# Patient Record
Sex: FEMALE | Race: WHITE | Employment: UNEMPLOYED | URBAN - METROPOLITAN AREA
[De-identification: names, ages, dates, MRNs, and addresses within clinical notes are randomized per-mention and may not be internally consistent; named-entity substitution may affect disease eponyms.]

---

## 2018-05-17 ENCOUNTER — HOSPITAL ENCOUNTER (EMERGENCY)
Age: 48
Discharge: HOME | End: 2018-05-17
Attending: EMERGENCY MEDICINE
Payer: SELF-PAY

## 2018-05-17 VITALS
SYSTOLIC BLOOD PRESSURE: 138 MMHG | RESPIRATION RATE: 18 BRPM | OXYGEN SATURATION: 98 % | HEART RATE: 69 BPM | DIASTOLIC BLOOD PRESSURE: 92 MMHG | TEMPERATURE: 98.6 F

## 2018-05-17 DIAGNOSIS — H60.331 ACUTE SWIMMER'S EAR OF RIGHT SIDE: Primary | ICD-10-CM

## 2018-05-17 DIAGNOSIS — Z76.0 MEDICATION REFILL: ICD-10-CM

## 2018-05-17 PROCEDURE — 99281 EMR DPT VST MAYX REQ PHY/QHP: CPT

## 2018-05-17 RX ORDER — ALBUTEROL SULFATE 90 UG/1
2 AEROSOL, METERED RESPIRATORY (INHALATION)
Qty: 1 INHALER | Refills: 0 | Status: SHIPPED | OUTPATIENT
Start: 2018-05-17 | End: 2019-12-09

## 2018-05-17 NOTE — ED TRIAGE NOTES
C/o rt ear pain X one week after swimming.  Pt also states she needs refill on neb treatment and rescue in [place]

## 2018-05-17 NOTE — ED PROVIDER NOTES
EMERGENCY DEPARTMENT HISTORY AND PHYSICAL EXAM    4:23 PM      Date: 5/17/2018  Patient Name: Candace Guerra    History of Presenting Illness     Chief Complaint   Patient presents with    Ear Pain         History Provided By: Patient    Chief Complaint: Ear pain  Duration:  1 week  Timing:  N/A  Location: Right ear  Quality: Throbbing  Severity: N/A  Modifying Factors: The patient states that he had went swimming and used cotton swabs in her ear. The patient states she has been taking ibuprofen, but her pain has not been relieved. Associated Symptoms: Denies nasal congestion and rhinorrhea. Additional History (Context): Candace Guerra is a 41 y.o. female with asthma who presents with right ear pain that began 1 week ago. The patient describes her pain as throbbing. She states that she had went swimming and then used cotton swabs in her ear. The patient states she has been taking ibuprofen, but her pain has not been relieved. She denies nasal congestion and rhinorrhea. The patient is also requesting a refill of her medication for her inhaler. The patient smokes tobacco. The patient has no further complaints. PCP: [facility]    Current Outpatient Prescriptions   Medication Sig Dispense Refill    albuterol (PROAIR HFA) 90 mcg/actuation inhaler Take 2 Puffs by inhalation every six (6) hours as needed for Wheezing. 1 Inhaler 0    neomycin-colist-hydrocortisone-thonzonium (CORTISPORIN-TC) otic suspension Administer 3 Drops in right ear four (4) times daily for 7 days. 5 mL 0    lisinopril-hydroCHLOROthiazide (PRINZIDE, ZESTORETIC) 10-12.5 mg per tablet Take 1 Tab by mouth daily. 30 Tab 2    fluticasone-salmeterol (ADVAIR DISKUS) 100-50 mcg/dose diskus inhaler Take 1 Puff by inhalation every twelve (12) hours. 1 Inhaler 0    fexofenadine (ALLEGRA) 60 mg tablet Take 1 Tab by mouth daily.  20 Tab 0       Past History     Past Medical History:  Past Medical History:   Diagnosis Date    Acute back pain with sciatica 2007    Asthma     H/O seasonal allergies     Hypertension     Ill-defined condition     neuropathy in feet       Past Surgical History:  Past Surgical History:   Procedure Laterality Date    HX DILATION AND CURETTAGE  1999    5-6    HX OTHER SURGICAL  1977    KIDNEY- blockage        Family History:  Family History   Problem Relation Age of Onset    COPD Mother     Diabetes Brother     Diabetes Maternal Grandmother        Social History:  Social History   Substance Use Topics    Smoking status: Current Every Day Smoker     Packs/day: 1.00     Years: 28.00    Smokeless tobacco: Former User    Alcohol use No       Allergies:  No Known Allergies      Review of Systems       Review of Systems   Constitutional: Negative for activity change, fatigue and fever. HENT: Positive for ear pain. Negative for congestion and rhinorrhea. Eyes: Negative for visual disturbance. Respiratory: Negative for shortness of breath. Cardiovascular: Negative for chest pain and palpitations. Gastrointestinal: Negative for abdominal pain, diarrhea, nausea and vomiting. Genitourinary: Negative for dysuria and hematuria. Musculoskeletal: Negative for back pain. Skin: Negative for rash. Neurological: Negative for dizziness, weakness and light-headedness. Psychiatric/Behavioral: Negative for agitation. All other systems reviewed and are negative. Physical Exam     Visit Vitals    BP (!) 138/92 (BP 1 Location: Left arm, BP Patient Position: Sitting)    Pulse 69    Temp 98.6 °F (37 °C)    Resp 18    SpO2 98%         Physical Exam   Constitutional: She appears well-developed and well-nourished. No distress. HENT:   Head: Normocephalic and atraumatic.    Right Ear: Tympanic membrane normal.   Left Ear: Tympanic membrane normal.   Nose: Nose normal.   Mouth/Throat: Oropharynx is clear and moist.   Erythema and edema of right canal.   Eyes: Conjunctivae and EOM are normal. Pupils are equal, round, and reactive to light. No scleral icterus. Neck: Normal range of motion. Neck supple. No JVD present. No tracheal deviation present. No thyromegaly present. Cardiovascular: Normal rate and regular rhythm. Exam reveals no friction rub. No murmur heard. Pulmonary/Chest: Effort normal and breath sounds normal. No stridor. She exhibits no tenderness. Abdominal: Soft. Bowel sounds are normal. She exhibits no distension. There is no tenderness. There is no rebound and no guarding. Musculoskeletal: Normal range of motion. She exhibits no edema or tenderness. Lymphadenopathy:     She has no cervical adenopathy. Neurological: She is alert. No cranial nerve deficit. Coordination normal.   Skin: Skin is warm and dry. Psychiatric: She has a normal mood and affect. Her behavior is normal. Judgment and thought content normal.   Nursing note and vitals reviewed. Medical Decision Making   I am the first provider for this patient. I reviewed the vital signs, available nursing notes, past medical history, past surgical history, family history and social history. Vital Signs-Reviewed the patient's vital signs. Records Reviewed: Nursing Notes (Time of Review: 4:23 PM)    ED Course: Progress Notes, Reevaluation, and Consults:      Provider Notes (Medical Decision Making):   Erythema and edema to right ear canal x 1 week. She notes swimming recently. Will treat with cortisporin otic. She also needs a refill of her albuterol inhaler -- will write for this as well. Pt to f/u with PCP. Diagnosis     Clinical Impression:   1. Acute swimmer's ear of right side    2.  Medication refill        Disposition: Discharged    Follow-up Information     Follow up With Details Comments [address] In 3 days  [address] [id number]  [phone]      [facility] EMERGENCY DEPT  If symptoms worsen [address] [address]  [phone] Patient's Medications   Start Taking    NEOMYCIN-COLIST-HYDROCORTISONE-THONZONIUM (CORTISPORIN-TC) OTIC SUSPENSION    Administer 3 Drops in right ear four (4) times daily for 7 days. Continue Taking    FEXOFENADINE (ALLEGRA) 60 MG TABLET    Take 1 Tab by mouth daily. FLUTICASONE-SALMETEROL (ADVAIR DISKUS) 100-50 MCG/DOSE DISKUS INHALER    Take 1 Puff by inhalation every twelve (12) hours. LISINOPRIL-HYDROCHLOROTHIAZIDE (PRINZIDE, ZESTORETIC) 10-12.5 MG PER TABLET    Take 1 Tab by mouth daily. These Medications have changed    Modified Medication Previous Medication    ALBUTEROL (PROAIR HFA) 90 MCG/ACTUATION INHALER albuterol (PROAIR HFA) 90 mcg/actuation inhaler       Take 2 Puffs by inhalation every six (6) hours as needed for Wheezing. Take 2 Puffs by inhalation every six (6) hours as needed for Wheezing. Stop Taking    No medications on file     _______________________________    Attestations:  Swati [id number] [facility] acting as a scribe for and in the presence of Susie Painter      May 17, 2018 at 4:53 PM       Provider Attestation:      I personally performed the services described in the documentation, reviewed the documentation, as recorded by the scribe in my presence, and it accurately and completely records my words and actions.  May 17, 2018 at 4:53 PM - LEATHA Painter  _______________________________    LEATHA Ashby

## 2018-10-18 ENCOUNTER — APPOINTMENT (OUTPATIENT)
Dept: CT IMAGING | Age: 48
End: 2018-10-18
Attending: PHYSICIAN ASSISTANT
Payer: SELF-PAY

## 2018-10-18 ENCOUNTER — HOSPITAL ENCOUNTER (EMERGENCY)
Age: 48
Discharge: HOME | End: 2018-10-18
Attending: EMERGENCY MEDICINE
Payer: SELF-PAY

## 2018-10-18 VITALS
SYSTOLIC BLOOD PRESSURE: 159 MMHG | HEART RATE: 69 BPM | DIASTOLIC BLOOD PRESSURE: 110 MMHG | RESPIRATION RATE: 14 BRPM | OXYGEN SATURATION: 98 % | TEMPERATURE: 97.6 F

## 2018-10-18 DIAGNOSIS — G89.29 CHRONIC NONINTRACTABLE HEADACHE, UNSPECIFIED HEADACHE TYPE: Primary | ICD-10-CM

## 2018-10-18 DIAGNOSIS — I10 ESSENTIAL HYPERTENSION: ICD-10-CM

## 2018-10-18 DIAGNOSIS — R51.9 CHRONIC NONINTRACTABLE HEADACHE, UNSPECIFIED HEADACHE TYPE: Primary | ICD-10-CM

## 2018-10-18 LAB
ANION GAP SERPL CALC-SCNC: 7 MMOL/L (ref 3–18)
APPEARANCE UR: ABNORMAL
BACTERIA URNS QL MICRO: ABNORMAL /HPF
BASOPHILS # BLD: 0 K/UL (ref 0–0.1)
BASOPHILS NFR BLD: 0 % (ref 0–2)
BILIRUB UR QL: NEGATIVE
BUN SERPL-MCNC: 7 MG/DL (ref 7–18)
BUN/CREAT SERPL: 8 (ref 12–20)
CALCIUM SERPL-MCNC: 8.2 MG/DL (ref 8.5–10.1)
CHLORIDE SERPL-SCNC: 104 MMOL/L (ref 100–108)
CO2 SERPL-SCNC: 25 MMOL/L (ref 21–32)
COLOR UR: ABNORMAL
CREAT SERPL-MCNC: 0.84 MG/DL (ref 0.6–1.3)
DIFFERENTIAL METHOD BLD: ABNORMAL
EOSINOPHIL # BLD: 0.3 K/UL (ref 0–0.4)
EOSINOPHIL NFR BLD: 3 % (ref 0–5)
EPITH CASTS URNS QL MICRO: ABNORMAL /LPF (ref 0–5)
ERYTHROCYTE [DISTWIDTH] IN BLOOD BY AUTOMATED COUNT: 15.2 % (ref 11.6–14.5)
GLUCOSE SERPL-MCNC: 80 MG/DL (ref 74–99)
GLUCOSE UR STRIP.AUTO-MCNC: NEGATIVE MG/DL
HCT VFR BLD AUTO: 43.2 % (ref 35–45)
HGB BLD-MCNC: 14.8 G/DL (ref 12–16)
HGB UR QL STRIP: NEGATIVE
KETONES UR QL STRIP.AUTO: ABNORMAL MG/DL
LEUKOCYTE ESTERASE UR QL STRIP.AUTO: ABNORMAL
LYMPHOCYTES # BLD: 2.6 K/UL (ref 0.9–3.6)
LYMPHOCYTES NFR BLD: 33 % (ref 21–52)
MCH RBC QN AUTO: 28.9 PG (ref 24–34)
MCHC RBC AUTO-ENTMCNC: 34.3 G/DL (ref 31–37)
MCV RBC AUTO: 84.4 FL (ref 74–97)
MONOCYTES # BLD: 0.5 K/UL (ref 0.05–1.2)
MONOCYTES NFR BLD: 7 % (ref 3–10)
NEUTS SEG # BLD: 4.4 K/UL (ref 1.8–8)
NEUTS SEG NFR BLD: 57 % (ref 40–73)
NITRITE UR QL STRIP.AUTO: NEGATIVE
PH UR STRIP: 7.5 [PH] (ref 5–8)
PLATELET # BLD AUTO: 194 K/UL (ref 135–420)
PMV BLD AUTO: 11.2 FL (ref 9.2–11.8)
POTASSIUM SERPL-SCNC: 3.5 MMOL/L (ref 3.5–5.5)
PROT UR STRIP-MCNC: ABNORMAL MG/DL
RBC # BLD AUTO: 5.12 M/UL (ref 4.2–5.3)
RBC #/AREA URNS HPF: NEGATIVE /HPF (ref 0–5)
SODIUM SERPL-SCNC: 136 MMOL/L (ref 136–145)
SP GR UR REFRACTOMETRY: 1.02 (ref 1–1.03)
UROBILINOGEN UR QL STRIP.AUTO: 1 EU/DL (ref 0.2–1)
WBC # BLD AUTO: 7.7 K/UL (ref 4.6–13.2)
WBC URNS QL MICRO: ABNORMAL /HPF (ref 0–4)

## 2018-10-18 PROCEDURE — 81001 URINALYSIS AUTO W/SCOPE: CPT | Performed by: PHYSICIAN ASSISTANT

## 2018-10-18 PROCEDURE — 70450 CT HEAD/BRAIN W/O DYE: CPT

## 2018-10-18 PROCEDURE — 99284 EMERGENCY DEPT VISIT MOD MDM: CPT

## 2018-10-18 PROCEDURE — 74011250637 HC RX REV CODE- 250/637: Performed by: PHYSICIAN ASSISTANT

## 2018-10-18 PROCEDURE — 80048 BASIC METABOLIC PNL TOTAL CA: CPT | Performed by: PHYSICIAN ASSISTANT

## 2018-10-18 PROCEDURE — 85025 COMPLETE CBC W/AUTO DIFF WBC: CPT | Performed by: PHYSICIAN ASSISTANT

## 2018-10-18 RX ORDER — ACETAMINOPHEN 500 MG
1000 TABLET ORAL
Status: COMPLETED | OUTPATIENT
Start: 2018-10-18 | End: 2018-10-18

## 2018-10-18 RX ORDER — LISINOPRIL AND HYDROCHLOROTHIAZIDE 10; 12.5 MG/1; MG/1
1 TABLET ORAL DAILY
Qty: 30 TAB | Refills: 0 | Status: SHIPPED | OUTPATIENT
Start: 2018-10-18 | End: 2021-01-18 | Stop reason: SDUPTHER

## 2018-10-18 RX ORDER — HYDROCHLOROTHIAZIDE 25 MG/1
25 TABLET ORAL
Status: COMPLETED | OUTPATIENT
Start: 2018-10-18 | End: 2018-10-18

## 2018-10-18 RX ORDER — FLUTICASONE PROPIONATE AND SALMETEROL 100; 50 UG/1; UG/1
1 POWDER RESPIRATORY (INHALATION) EVERY 12 HOURS
Qty: 1 INHALER | Refills: 0 | Status: SHIPPED | OUTPATIENT
Start: 2018-10-18 | End: 2019-12-09

## 2018-10-18 RX ADMIN — ACETAMINOPHEN 1000 MG: 500 TABLET, FILM COATED ORAL at 14:07

## 2018-10-18 RX ADMIN — HYDROCHLOROTHIAZIDE 25 MG: 25 TABLET ORAL at 14:07

## 2018-10-18 NOTE — ED NOTES
I performed a brief evaluation, including history and physical, of the patient here in triage and I have determined that pt will need further treatment and evaluation from the FT Provider. I have placed initial orders to help in expediting patients care. October 18, 2018 at 1:04 PM - Alfredo Hoffman PA-C Visit Vitals BP (!) 178/107 (BP 1 Location: Left arm, BP Patient Position: At rest) Pulse 98 Temp 97.6 °F (36.4 °C) Resp 14 SpO2 98%

## 2018-10-18 NOTE — ED PROVIDER NOTES
EMERGENCY DEPARTMENT HISTORY AND PHYSICAL EXAM 
 
1:12 PM 
 
 
Date: 10/18/2018 Patient Name: Marlin Santos History of Presenting Illness Chief Complaint Patient presents with  Migraine History Provided By: Patient Chief Complaint: Headache Duration: 3  Weeks Timing:  Constant Location: Global 
Quality: Throbbing Severity: 8 out of 10 Modifying Factors: Tylenol Associated Symptoms: denies any other associated signs or symptoms Additional History (Context): Marlin Santos is a 41 y.o. female with hx of hypertension who presents with headache for 3 weeks. Pain is global, throbbing, and constant. Patient reports similar headache four years ago that she associates with HTN. She began treatment for HTN one year ago with HCTZ but has not taken the medication for 2 months because of insurance issues. She denies visual disturbances, photophobia, phonophobia, neck pain, fever, chills, nausea, vomiting, numbness/tingling, weakness, dizziness, chest pain, dyspnea. Pt did not take any medication for symptoms PTA. PCP: None Current Outpatient Medications Medication Sig Dispense Refill  lisinopril-hydroCHLOROthiazide (PRINZIDE, ZESTORETIC) 10-12.5 mg per tablet Take 1 Tab by mouth daily. 30 Tab 0  
 fluticasone-salmeterol (ADVAIR DISKUS) 100-50 mcg/dose diskus inhaler Take 1 Puff by inhalation every twelve (12) hours. 1 Inhaler 0  
 albuterol (PROAIR HFA) 90 mcg/actuation inhaler Take 2 Puffs by inhalation every six (6) hours as needed for Wheezing. 1 Inhaler 0  
 fexofenadine (ALLEGRA) 60 mg tablet Take 1 Tab by mouth daily. 20 Tab 0 Past History Past Medical History: 
Past Medical History:  
Diagnosis Date  Acute back pain with sciatica 2007  Asthma  H/O seasonal allergies  Hypertension  Ill-defined condition   
 neuropathy in feet Past Surgical History: 
Past Surgical History:  
Procedure Laterality Date [address]  
 5-6 [address] KIDNEY- blockage Family History: 
Family History Problem Relation Age of Onset  COPD Mother  Diabetes Brother  Diabetes Maternal Grandmother Social History: 
Social History Tobacco Use  Smoking status: Current Every Day Smoker Packs/day: 1.00 Years: 28.00 Pack years: 28.00  Smokeless tobacco: Former User Substance Use Topics  Alcohol use: No  
 Drug use: No  
 
 
Allergies: Allergies Allergen Reactions  Plavix [Clopidogrel] Hives Review of Systems Review of Systems Constitutional: Negative for chills, fatigue, fever and unexpected weight change. Eyes: Negative for photophobia, pain and visual disturbance. Respiratory: Negative for chest tightness and shortness of breath. Cardiovascular: Negative for chest pain and palpitations. Gastrointestinal: Negative. Musculoskeletal: Negative for neck pain and neck stiffness. Skin: Negative for rash. Neurological: Positive for headaches. Negative for dizziness and light-headedness. Physical Exam  
 
Visit Vitals BP (!) 159/110 Pulse 69 Temp 97.6 °F (36.4 °C) Resp 14 SpO2 98% Physical Exam  
Constitutional: She is oriented to person, place, and time. She appears well-developed and well-nourished. No distress. HENT:  
Head: Normocephalic and atraumatic. Eyes: Conjunctivae and EOM are normal. Pupils are equal, round, and reactive to light. Neck: Normal range of motion. Neck supple. No spinous process tenderness present. No neck rigidity. Normal range of motion present. No nuchal rigidity Cardiovascular: Normal rate, regular rhythm, normal heart sounds and intact distal pulses. Pulmonary/Chest: Effort normal and breath sounds normal.  
Neurological: She is alert and oriented to person, place, and time. She has normal strength. She is not disoriented.  No cranial nerve deficit or sensory deficit. She exhibits normal muscle tone. She displays a negative Romberg sign. Coordination and gait normal. GCS eye subscore is 4. GCS verbal subscore is 5. GCS motor subscore is 6. Skin: No rash noted. She is not diaphoretic. Nursing note and vitals reviewed. Diagnostic Study Results Labs - Recent Results (from the past 12 hour(s)) URINALYSIS W/ RFLX MICROSCOPIC Collection Time: 10/18/18  1:00 PM  
Result Value Ref Range Color DARK YELLOW Appearance TURBID Specific gravity 1.025 1.005 - 1.030    
 pH (UA) 7.5 5.0 - 8.0 Protein TRACE (A) NEG mg/dL Glucose NEGATIVE  NEG mg/dL Ketone TRACE (A) NEG mg/dL Bilirubin NEGATIVE  NEG Blood NEGATIVE  NEG Urobilinogen 1.0 0.2 - 1.0 EU/dL Nitrites NEGATIVE  NEG Leukocyte Esterase TRACE (A) NEG URINE MICROSCOPIC ONLY Collection Time: 10/18/18  1:00 PM  
Result Value Ref Range WBC 0 to 3 0 - 4 /hpf  
 RBC NEGATIVE  0 - 5 /hpf Epithelial cells 4+ 0 - 5 /lpf Bacteria FEW (A) NEG /hpf METABOLIC PANEL, BASIC Collection Time: 10/18/18  2:13 PM  
Result Value Ref Range Sodium 136 136 - 145 mmol/L Potassium 3.5 3.5 - 5.5 mmol/L Chloride 104 100 - 108 mmol/L  
 CO2 25 21 - 32 mmol/L Anion gap 7 3.0 - 18 mmol/L Glucose 80 74 - 99 mg/dL BUN 7 7.0 - 18 MG/DL Creatinine 0.84 0.6 - 1.3 MG/DL  
 BUN/Creatinine ratio 8 (L) 12 - 20 GFR est AA >60 >60 ml/min/1.73m2 GFR est non-AA >60 >60 ml/min/1.73m2 Calcium 8.2 (L) 8.5 - 10.1 MG/DL  
CBC WITH AUTOMATED DIFF Collection Time: 10/18/18  2:13 PM  
Result Value Ref Range WBC 7.7 4.6 - 13.2 K/uL  
 RBC 5.12 4.20 - 5.30 M/uL  
 HGB 14.8 12.0 - 16.0 g/dL HCT 43.2 35.0 - 45.0 % MCV 84.4 74.0 - 97.0 FL  
 MCH 28.9 24.0 - 34.0 PG  
 MCHC 34.3 31.0 - 37.0 g/dL  
 RDW 15.2 (H) 11.6 - 14.5 % PLATELET 176 656 - 565 K/uL MPV 11.2 9.2 - 11.8 FL  
 NEUTROPHILS 57 40 - 73 % LYMPHOCYTES 33 21 - 52 % MONOCYTES 7 3 - 10 % EOSINOPHILS 3 0 - 5 % BASOPHILS 0 0 - 2 %  
 ABS. NEUTROPHILS 4.4 1.8 - 8.0 K/UL  
 ABS. LYMPHOCYTES 2.6 0.9 - 3.6 K/UL  
 ABS. MONOCYTES 0.5 0.05 - 1.2 K/UL  
 ABS. EOSINOPHILS 0.3 0.0 - 0.4 K/UL  
 ABS. BASOPHILS 0.0 0.0 - 0.1 K/UL  
 DF AUTOMATED Radiologic Studies -  
CT HEAD WO CONT Final Result IMPRESSION:  
No acute hemorrhage or midline shift. No evidence for a fracture in the calvarium. Margot Luz Medical Decision Making I am the first provider for this patient. I reviewed the vital signs, available nursing notes, past medical history, past surgical history, family history and social history. Vital Signs-Reviewed the patient's vital signs. Records Reviewed: Nursing Notes and Old Medical Records (Time of Review: 1:12 PM) 
 
ED Course: Progress Notes, Reevaluation, and Consults: 
2:22PM:  Pt given Tylenol 1000mg and HCTZ 25mg 3:49:  Pt reports headache has improved. Was 9/10 and is now 4/10 
03:59:  Discussed CT results with patient and she understands the importance of following up with primary care physician for HTN control. She now reports her headache has improved to 2/10. Looks well, texting on phone, stable for d/c with outpatient follow-up.  by to see patient. Provider Notes (Medical Decision Making):  
Patient presents with global throbbing headache for 3 weeks; similar headache 4 years ago. She denies photophobia, nausea, vomiting, nuchal rigidity, chest pain, dyspnea, and visual disturbances. CT scan of head is negative. Headache and HTN have improved during visit with dose of home medication and Tylenol. Patient understands the importance of close follow up with primary care physician for HTN control. Diagnosis Clinical Impression: 1. Chronic nonintractable headache, unspecified headache type 2. Essential hypertension Disposition: home Follow-up Information Follow up With Specialties Details Why Contact Info [address]urnima Sanchez on 10/22/2018 GO TO [facility] AT [address] [facility]. TAKE ID AND PRERPARE TO BE THERE A WHILE DUE TO DEMAND. FIRST COME FIRST SERVE BASES SO ARRIVE 7:30AM-7:45AM [address] [address]) [phone] None    None (395) Patient stated that they have no PCP None    None (395) Patient stated that they have no PCP Medication List  
  
CONTINUE taking these medications   
fluticasone-salmeterol 100-50 mcg/dose diskus inhaler Commonly known as:  ADVAIR DISKUS Take 1 Puff by inhalation every twelve (12) hours. lisinopril-hydroCHLOROthiazide 10-12.5 mg per tablet Commonly known as:  Buddie Fus Take 1 Tab by mouth daily. ASK your doctor about these medications   
albuterol 90 mcg/actuation inhaler Commonly known as:  PROAIR HFA Take 2 Puffs by inhalation every six (6) hours as needed for Wheezing. fexofenadine 60 mg tablet Commonly known as:  Banerjee Dory Take 1 Tab by mouth daily. Where to Get Your Medications Information about where to get these medications is not yet available Ask your nurse or doctor about these medications · fluticasone-salmeterol 100-50 mcg/dose diskus inhaler · lisinopril-hydroCHLOROthiazide 10-12.5 mg per tablet

## 2018-10-18 NOTE — DISCHARGE INSTRUCTIONS
Head or Face Pain: Care Instructions  Your Care Instructions    Common causes of head or face pain are allergies, stress, and injuries. Other causes include tooth problems and sinus infections. Eating certain foods, such as chocolate or cheese, or drinking certain liquids, such as coffee or cola, can cause head pain for some people. If you have mild head pain, you may not need treatment. It is important to watch your symptoms and talk to your doctor if your pain continues or gets worse. Follow-up care is a key part of your treatment and safety. Be sure to make and go to all appointments, and call your doctor if you are having problems. It's also a good idea to know your test results and keep a list of the medicines you take. How can you care for yourself at home? · Take pain medicines exactly as directed. ? If the doctor gave you a prescription medicine for pain, take it as prescribed. ? If you are not taking a prescription pain medicine, ask your doctor if you can take an over-the-counter pain medicine. · Take it easy for the next few days or longer if you are not feeling well. · Use a warm, moist towel or heating pad set on low to relax tight muscles in your shoulder and neck. Have someone gently massage your neck and shoulders. · Put ice or a cold pack on the area for 10 to 20 minutes at a time. Put a thin cloth between the ice and your skin. When should you call for help? Call 911 anytime you think you may need emergency care. For example, call if:    · You have twitching, jerking, or a seizure.     · You passed out (lost consciousness).     · You have symptoms of a stroke. These may include:  ? Sudden numbness, tingling, weakness, or loss of movement in your face, arm, or leg, especially on only one side of your body. ? Sudden vision changes. ? Sudden trouble speaking. ? Sudden confusion or trouble understanding simple statements. ? Sudden problems with walking or balance.   ? A sudden, severe headache that is different from past headaches.     · You have jaw pain and pain in your chest, shoulder, neck, or arm.    Call your doctor now or seek immediate medical care if:    · You have a fever with a stiff neck or a severe headache.     · You have nausea and vomiting, or you cannot keep food or liquids down.    Watch closely for changes in your health, and be sure to contact your doctor if:    · Your head or face pain does not get better as expected. Where can you learn more? Go to http://sophie-papo.info/. Enter P568 in the search box to learn more about \"Head or Face Pain: Care Instructions. \"  Current as of: November 20, 2017  Content Version: 11.8  © 3861-7521 [employer]. Care instructions adapted under license by [employer] (which disclaims liability or warranty for this information). If you have questions about a medical condition or this instruction, always ask your healthcare professional. [address] any warranty or liability for your use of this information. DASH Diet: Care Instructions  Your Care Instructions    The DASH diet is an eating plan that can help lower your blood pressure. DASH stands for Dietary Approaches to Stop Hypertension. Hypertension is high blood pressure. The DASH diet focuses on eating foods that are high in calcium, potassium, and magnesium. These nutrients can lower blood pressure. The foods that are highest in these nutrients are fruits, vegetables, low-fat dairy products, nuts, seeds, and legumes. But taking calcium, potassium, and magnesium supplements instead of eating foods that are high in those nutrients does not have the same effect. The DASH diet also includes whole grains, fish, and poultry. The DASH diet is one of several lifestyle changes your doctor may recommend to lower your high blood pressure.  Your doctor may also want you to decrease the amount of sodium in your diet. Lowering sodium while following the DASH diet can lower blood pressure even further than just the DASH diet alone. Follow-up care is a key part of your treatment and safety. Be sure to make and go to all appointments, and call your doctor if you are having problems. It's also a good idea to know your test results and keep a list of the medicines you take. How can you care for yourself at home? Following the DASH diet  · Eat 4 to 5 servings of fruit each day. A serving is 1 medium-sized piece of fruit, ½ cup chopped or canned fruit, 1/4 cup dried fruit, or 4 ounces (½ cup) of fruit juice. Choose fruit more often than fruit juice. · Eat 4 to 5 servings of vegetables each day. A serving is 1 cup of lettuce or raw leafy vegetables, ½ cup of chopped or cooked vegetables, or 4 ounces (½ cup) of vegetable juice. Choose vegetables more often than vegetable juice. · Get 2 to 3 servings of low-fat and fat-free dairy each day. A serving is 8 ounces of milk, 1 cup of yogurt, or 1 ½ ounces of cheese. · Eat 6 to 8 servings of grains each day. A serving is 1 slice of bread, 1 ounce of dry cereal, or ½ cup of cooked rice, pasta, or cooked cereal. Try to choose whole-grain products as much as possible. · Limit lean meat, poultry, and fish to 2 servings each day. A serving is 3 ounces, about the size of a deck of cards. · Eat 4 to 5 servings of nuts, seeds, and legumes (cooked dried beans, lentils, and split peas) each week. A serving is 1/3 cup of nuts, 2 tablespoons of seeds, or ½ cup of cooked beans or peas. · Limit fats and oils to 2 to 3 servings each day. A serving is 1 teaspoon of vegetable oil or 2 tablespoons of salad dressing. · Limit sweets and added sugars to 5 servings or less a week. A serving is 1 tablespoon jelly or jam, ½ cup sorbet, or 1 cup of lemonade. · Eat less than 2,300 milligrams (mg) of sodium a day.  If you limit your sodium to 1,500 mg a day, you can lower your blood pressure even more.  Tips for success  · Start small. Do not try to make dramatic changes to your diet all at once. You might feel that you are missing out on your favorite foods and then be more likely to not follow the plan. Make small changes, and stick with them. Once those changes become habit, add a few more changes. · Try some of the following:  ? Make it a goal to eat a fruit or vegetable at every meal and at snacks. This will make it easy to get the recommended amount of fruits and vegetables each day. ? Try yogurt topped with fruit and nuts for a snack or healthy dessert. ? Add lettuce, tomato, cucumber, and onion to sandwiches. ? Combine a ready-made pizza crust with low-fat mozzarella cheese and lots of vegetable toppings. Try using tomatoes, squash, spinach, broccoli, carrots, cauliflower, and onions. ? Have a variety of cut-up vegetables with a low-fat dip as an appetizer instead of chips and dip. ? Sprinkle sunflower seeds or chopped almonds over salads. Or try adding chopped walnuts or almonds to cooked vegetables. ? Try some vegetarian meals using beans and peas. Add garbanzo or kidney beans to salads. Make burritos and tacos with mashed osborn beans or black beans. Where can you learn more? Go to http://sophie-papo.info/. Enter L807 in the search box to learn more about \"DASH Diet: Care Instructions. \"  Current as of: December 6, 2017  Content Version: 11.8  © 9127-5518 [email or web address]. Care instructions adapted under license by [employer] (which disclaims liability or warranty for this information). If you have questions about a medical condition or this instruction, always ask your healthcare professional. [place] [address] any warranty or liability for your use of this information.            High Blood Pressure: Care Instructions  Your Care Instructions    If your blood pressure is usually above 130/80, you have high blood pressure, or hypertension. That means the top number is 130 or higher or the bottom number is 80 or higher, or both. Despite what a lot of people think, high blood pressure usually doesn't cause headaches or make you feel dizzy or lightheaded. It usually has no symptoms. But it does increase your risk for heart attack, stroke, and kidney or eye damage. The higher your blood pressure, the more your risk increases. Your doctor will give you a goal for your blood pressure. Your goal will be based on your health and your age. Lifestyle changes, such as eating healthy and being active, are always important to help lower blood pressure. You might also take medicine to reach your blood pressure goal.  Follow-up care is a key part of your treatment and safety. Be sure to make and go to all appointments, and call your doctor if you are having problems. It's also a good idea to know your test results and keep a list of the medicines you take. How can you care for yourself at home? Medical treatment  · If you stop taking your medicine, your blood pressure will go back up. You may take one or more types of medicine to lower your blood pressure. Be safe with medicines. Take your medicine exactly as prescribed. Call your doctor if you think you are having a problem with your medicine. · Talk to your doctor before you start taking aspirin every day. Aspirin can help certain people lower their risk of a heart attack or stroke. But taking aspirin isn't right for everyone, because it can cause serious bleeding. · See your doctor regularly. You may need to see the doctor more often at first or until your blood pressure comes down. · If you are taking blood pressure medicine, talk to your doctor before you take decongestants or anti-inflammatory medicine, such as ibuprofen. Some of these medicines can raise blood pressure. · Learn how to check your blood pressure at home. Lifestyle changes  · Stay at a healthy weight.  This is especially important if you put on weight around the waist. Losing even 10 pounds can help you lower your blood pressure. · If your doctor recommends it, get more exercise. Walking is a good choice. Bit by bit, increase the amount you walk every day. Try for at least 30 minutes on most days of the week. You also may want to swim, bike, or do other activities. · Avoid or limit alcohol. Talk to your doctor about whether you can drink any alcohol. · Try to limit how much sodium you eat to less than 2,300 milligrams (mg) a day. Your doctor may ask you to try to eat less than 1,500 mg a day. · Eat plenty of fruits (such as bananas and oranges), vegetables, legumes, whole grains, and low-fat dairy products. · Lower the amount of saturated fat in your diet. Saturated fat is found in animal products such as milk, cheese, and meat. Limiting these foods may help you lose weight and also lower your risk for heart disease. · Do not smoke. Smoking increases your risk for heart attack and stroke. If you need help quitting, talk to your doctor about stop-smoking programs and medicines. These can increase your chances of quitting for good. When should you call for help? Call 911 anytime you think you may need emergency care. This may mean having symptoms that suggest that your blood pressure is causing a serious heart or blood vessel problem. Your blood pressure may be over 180/120.   For example, call 911 if:    · You have symptoms of a heart attack. These may include:  ? Chest pain or pressure, or a strange feeling in the chest.  ? Sweating. ? Shortness of breath. ? Nausea or vomiting. ? Pain, pressure, or a strange feeling in the back, neck, jaw, or upper belly or in one or both shoulders or arms. ? Lightheadedness or sudden weakness. ? A fast or irregular heartbeat.     · You have symptoms of a stroke.  These may include:  ? Sudden numbness, tingling, weakness, or loss of movement in your face, arm, or leg, especially on only one side of your body. ? Sudden vision changes. ? Sudden trouble speaking. ? Sudden confusion or trouble understanding simple statements. ? Sudden problems with walking or balance. ? A sudden, severe headache that is different from past headaches.     · You have severe back or belly pain.    Do not wait until your blood pressure comes down on its own. Get help right away.   Call your doctor now or seek immediate care if:    · Your blood pressure is much higher than normal (such as 180/120 or higher), but you don't have symptoms.     · You think high blood pressure is causing symptoms, such as:  ? Severe headache.  ? Blurry vision.    Watch closely for changes in your health, and be sure to contact your doctor if:    · Your blood pressure measures higher than your doctor recommends at least 2 times. That means the top number is higher or the bottom number is higher, or both.     · You think you may be having side effects from your blood pressure medicine. Where can you learn more? Go to http://sophie-papo.info/. Enter X714 in the search box to learn more about \"High Blood Pressure: Care Instructions. \"  Current as of: December 6, 2017  Content Version: 11.8  © 1635-2926 [employer]. Care instructions adapted under license by [employer] (which disclaims liability or warranty for this information). If you have questions about a medical condition or this instruction, always ask your healthcare professional. [place] [address] any warranty or liability for your use of this information.

## 2018-10-18 NOTE — ED TRIAGE NOTES
Patient arrived from [facility] c/o migraine x2 weeks. Patient also has been unable to refill blood pressure medications and  Thinks it may be related. Patient allergies up to date

## 2021-01-13 PROBLEM — I10 ACCELERATED HYPERTENSION: Status: ACTIVE | Noted: 2021-01-13

## 2021-01-13 PROBLEM — I63.9 ACUTE CVA (CEREBROVASCULAR ACCIDENT) (HCC): Status: ACTIVE | Noted: 2021-01-13
